# Patient Record
Sex: MALE | Race: WHITE | NOT HISPANIC OR LATINO | Employment: FULL TIME | URBAN - METROPOLITAN AREA
[De-identification: names, ages, dates, MRNs, and addresses within clinical notes are randomized per-mention and may not be internally consistent; named-entity substitution may affect disease eponyms.]

---

## 2022-09-15 ENCOUNTER — HOSPITAL ENCOUNTER (EMERGENCY)
Facility: HOSPITAL | Age: 22
Discharge: HOME | End: 2022-09-15
Attending: EMERGENCY MEDICINE | Admitting: EMERGENCY MEDICINE

## 2022-09-15 VITALS
RESPIRATION RATE: 14 BRPM | OXYGEN SATURATION: 99 % | HEART RATE: 68 BPM | DIASTOLIC BLOOD PRESSURE: 77 MMHG | TEMPERATURE: 97.8 F | WEIGHT: 260 LBS | SYSTOLIC BLOOD PRESSURE: 122 MMHG | HEIGHT: 73 IN | BODY MASS INDEX: 34.46 KG/M2

## 2022-09-15 DIAGNOSIS — R04.0 EPISTAXIS: Primary | ICD-10-CM

## 2022-09-15 PROCEDURE — 99282 EMERGENCY DEPT VISIT SF MDM: CPT

## 2022-09-15 PROCEDURE — 99282 EMERGENCY DEPT VISIT SF MDM: CPT | Performed by: EMERGENCY MEDICINE

## 2022-09-15 RX ORDER — OXYMETAZOLINE HYDROCHLORIDE 0.05 G/100ML
2 SPRAY NASAL 2 TIMES DAILY PRN
Qty: 15 ML | Refills: 0 | Status: SHIPPED | OUTPATIENT
Start: 2022-09-15 | End: 2022-09-18

## 2022-09-15 RX ORDER — METHYLPHENIDATE HYDROCHLORIDE 54 MG/1
54 TABLET ORAL DAILY
COMMUNITY

## 2022-09-16 NOTE — DISCHARGE INSTRUCTIONS
Please read the attached discharge instructions.  Come back to the emergency department for any new or concerning symptoms.    If a nosebleed occurs, spray 2 sprays of Afrin into the nose, then apply nasal clamp for at least 10 minutes, preferably 20 minutes.  If still no improvement, do another 2 sprays and wait another 20 minutes.  If still no improvement, come to the emergency department.    When you are not having a nosebleed, make sure that you are keeping your nasal passages moist with Vaseline, paying special attention to the nasal septum.    If no improvement within the next couple of weeks, see your primary care doctor for further evaluation. Tests for blood clotting issues may be necessary.

## 2022-09-16 NOTE — ED PROVIDER NOTES
[facility] FREE-STANDING ED / URGENT CARE    Patient: Go Condon 2000 [id number]  Physician: Marycruz Rosales MD  DOS: 9/15/2022    [facility]  History of Present Illness  22-year-old male with no significant past medical history presents with nearly daily nosebleeds for the past couple of months.  During one prolonged nosebleed, he felt a little lightheaded, but not persistently.  No nosebleed currently.  The patient is here because he is going on a trip soon and wants to know how to manage his nosebleeds.  He has been managing his nosebleeds by stuffing Kleenex into his nostrils.  Denies any easy bruisability or bleeding of the gums.  No medication changes.  No recent prolonged rhinorrhea.  He has had some rhinorrhea from allergies.        ROS  As reviewed in HPI above.    There is no problem list on file for this patient.    Prior to Admission medications    Medication Sig Start Date End Date Taking? Authorizing Provider   methylphenidate 54 MG CR tablet Take 54 mg by mouth Daily    Provider, MD Jay   oxymetazoline (AFRIN) 0.05 % nasal spray 2 sprays into the nostril(s) as directed by provider 2 (Two) Times a Day As Needed (nosebleed) for up to 3 days. 9/15/22 9/18/22  Marycruz Rosales MD     History reviewed. No pertinent past medical history.  History reviewed. No pertinent surgical history.  History reviewed. No pertinent family history.  Social History     Socioeconomic History   • Marital status: Significant Other     No Known Allergies    PHYSICAL EXAM  Vital Signs (last day) before discharge     Date/Time Temp Temp src Pulse Resp BP Patient Position SpO2    09/15/22 2348 -- -- 68 14 122/77 -- 99    09/15/22 2132 97.8 (36.6) -- 72 17 139/85 -- 97        Physical Exam  Vitals and nursing note reviewed.   Constitutional:       General: He is not in acute distress.     Appearance: He is well-developed.   HENT:      Head: Normocephalic and atraumatic.      Nose:      Comments: No epistaxis  noted.  No abnormalities of the nasal mucosa.  Eyes:      Conjunctiva/sclera: Conjunctivae normal.      Comments: No conjunctival pallor   Pulmonary:      Effort: Pulmonary effort is normal.   Abdominal:      General: There is no distension.   Skin:     General: Skin is warm and dry.      Coloration: Skin is not pale.      Findings: No bruising.   Neurological:      Mental Status: He is alert.   Psychiatric:         Mood and Affect: Mood normal.         Behavior: Behavior normal.           DIAGNOSTICS  No radiology results for the last day    Labs Reviewed - No data to display      MDM  Number of Diagnoses or Management Options  Epistaxis  Diagnosis management comments: Patient prescribed Afrin and instructed to apply Vaseline to his nose to prevent drying out.  Given instructions on how to stop a nosebleed with a nose clamp.  No other indication of blood dyscrasia.  He has been advised that if his symptoms do not improve within 2 weeks, he should follow-up with his primary care doctor to get work-up for blood clotting disorders.  Patient has been discharged home.           Medications - No data to display    Procedures    Final diagnoses:   Epistaxis       PATIENT CONNECTION - [facility] [address]  [phone]  In 1 week  If no improvement      ED Disposition     ED Disposition   Discharge    Condition   Stable    Comment   --             MD Connie Hernandez, Marycruz Azar MD  09/16/22 0043

## 2022-12-12 ENCOUNTER — HOSPITAL ENCOUNTER (OUTPATIENT)
Facility: HOSPITAL | Age: 22
Discharge: HOME | End: 2022-12-12
Attending: EMERGENCY MEDICINE

## 2022-12-12 ENCOUNTER — APPOINTMENT (OUTPATIENT)
Dept: GENERAL RADIOLOGY | Facility: HOSPITAL | Age: 22
End: 2022-12-12

## 2022-12-12 VITALS
OXYGEN SATURATION: 96 % | RESPIRATION RATE: 18 BRPM | WEIGHT: 250 LBS | HEART RATE: 77 BPM | TEMPERATURE: 98.4 F | SYSTOLIC BLOOD PRESSURE: 127 MMHG | DIASTOLIC BLOOD PRESSURE: 85 MMHG | HEIGHT: 73 IN | BODY MASS INDEX: 33.13 KG/M2

## 2022-12-12 DIAGNOSIS — B34.9 VIRAL SYNDROME: Primary | ICD-10-CM

## 2022-12-12 LAB
FLUAV SUBTYP SPEC NAA+PROBE: NOT DETECTED
FLUBV RNA ISLT QL NAA+PROBE: NOT DETECTED
SARS-COV-2 RNA RESP QL NAA+PROBE: NOT DETECTED
STREP A PCR: NOT DETECTED

## 2022-12-12 PROCEDURE — 71045 X-RAY EXAM CHEST 1 VIEW: CPT | Performed by: NURSE PRACTITIONER

## 2022-12-12 PROCEDURE — 87880 STREP A ASSAY W/OPTIC: CPT | Performed by: NURSE PRACTITIONER

## 2022-12-12 PROCEDURE — 87651 STREP A DNA AMP PROBE: CPT | Performed by: EMERGENCY MEDICINE

## 2022-12-12 PROCEDURE — 87636 SARSCOV2 & INF A&B AMP PRB: CPT | Performed by: NURSE PRACTITIONER

## 2022-12-12 PROCEDURE — 71045 X-RAY EXAM CHEST 1 VIEW: CPT

## 2022-12-12 PROCEDURE — 99213 OFFICE O/P EST LOW 20 MIN: CPT | Performed by: NURSE PRACTITIONER

## 2022-12-12 PROCEDURE — EDLOS: Performed by: NURSE PRACTITIONER

## 2022-12-12 PROCEDURE — 87636 SARSCOV2 & INF A&B AMP PRB: CPT | Performed by: EMERGENCY MEDICINE

## 2022-12-12 PROCEDURE — G0463 HOSPITAL OUTPT CLINIC VISIT: HCPCS | Performed by: NURSE PRACTITIONER

## 2022-12-12 RX ORDER — CETIRIZINE HYDROCHLORIDE 10 MG/1
10 TABLET ORAL NIGHTLY
Qty: 30 TABLET | Refills: 0 | Status: SHIPPED | OUTPATIENT
Start: 2022-12-12

## 2022-12-12 NOTE — FSED PROVIDER NOTE
EMERGENCY DEPARTMENT ENCOUNTER    Room Number:  04/04  Date seen:  12/12/2022  Time seen: 08:58 EST  PCP: Cristina Miller DO  Historian:     HPI:  Chief complaint:***  A complete HPI/ROS/PMH/PSH/SH/FH are unobtainable due to:   Context:Go Condon is a 22 y.o. male who presents to the ED with c/o ***    Timing:***  Duration: ***  Location:***  Radiation:***  Quality:***  Intensity/Severity:***  Associated Symptoms:***  Aggravating Factors:***  Alleviating Factors:***  Previous Episodes:***  Treatment before arrival:***      MEDICAL RECORD REVIEW  ***    ALLERGIES  Patient has no known allergies.    PAST MEDICAL HISTORY  Active Ambulatory Problems     Diagnosis Date Noted   • No Active Ambulatory Problems     Resolved Ambulatory Problems     Diagnosis Date Noted   • No Resolved Ambulatory Problems     No Additional Past Medical History       PAST SURGICAL HISTORY  No past surgical history on file.    FAMILY HISTORY  No family history on file.    SOCIAL HISTORY  Social History     Socioeconomic History   • Marital status: Significant Other       REVIEW OF SYSTEMS  Review of Systems    All systems reviewed and negative except for those discussed in HPI.     PHYSICAL EXAM    ED Triage Vitals [12/12/22 0854]   Temp Heart Rate Resp BP SpO2   98.4 °F (36.9 °C) 77 18 127/85 96 %      Temp src Heart Rate Source Patient Position BP Location FiO2 (%)   Oral Monitor Sitting Right arm --     Physical Exam      I have reviewed the triage vital signs and nursing notes.      GENERAL: ***not distressed  HENT: nares patent  EYES: no scleral icterus  NECK: no ROM limitations  CV: regular rhythm, regular rate  RESPIRATORY: normal effort  ABDOMEN: soft  : deferred  MUSCULOSKELETAL: no deformity  NEURO: alert, moves all extremities, follows commands  SKIN: warm, dry    LAB RESULTS  No results found for this or any previous visit (from the past 24 hour(s)).      RADIOLOGY RESULTS  No Radiology Exams Resulted Within Past 24 Hours        PROGRESS, DATA ANALYSIS, CONSULTS AND MEDICAL DECISION MAKING  All labs have been independently reviewed by me.  All radiology studies have been reviewed by me and discussed with radiologist dictating the report.  EKG's independently viewed and interpreted by me unless stated otherwise. Discussion below represents my analysis of pertinent findings related to patient's condition, differential diagnosis, treatment plan and final disposition.       Social Determinants of Health     Financial Resource Strain: Not on file   Food Insecurity: Not on file   Transportation Needs: Not on file   Physical Activity: Not on file   Stress: Not on file   Social Connections: Not on file   Intimate Partner Violence: Not on file   Housing Stability: Not on file       Orders placed during this visit:  No orders of the defined types were placed in this encounter.             *** Reviewed pt's history and workup with  ***.  After a bedside evaluation,  *** agrees with the plan of care.    DISCHARGE    Patient discharged in stable condition.      FOLLOW-UP  No follow-up provider specified.       Medication List      No changes were made to your prescriptions during this visit.         FOLLOW-UP  No follow-up provider specified.       Medication List      No changes were made to your prescriptions during this visit.           ***(FOR DISCHARGE) I discussed all results and noted any abnormalities with patient.  Discussed absoute need to recheck abnormalities with their family physician.  I answered any of the patient's questions.  Discussed plan for discharge, as there is no emergent indication for admission.  Pt is agreeable and understands need for follow up and repeat testing.  Pt is aware that discharge does not mean that nothing is wrong but it indicates no emergency is present and they must continue care with their family physician.  Pt is discharged with instructions to follow up with primary care doctor to have their  "blood pressure rechecked.     ***(FOR AMA)The patient would like to leave Against Medical Advice.  I have explained the risks of leaving prior to completion of evaluation.  The patient verbally agrees to accept these risks, including worsening of condition, complications and death.  They were of sound mind and mentally stable to make decisions at the time of their AMA departure.  I have encouarged the patient to return the ED at any time if symptoms persist or for any additional concerns.  I verbally advised them to follow up with their primary physician or local health clinic.    ***(FOR ADMIT) Based on the patient's lab findings and presenting symptoms, the doctor and I feel it is appropriate to admit the patient for further management, evaluation, and treatment.  I have discussed this with the admitting team.  I have also discussed this with the patient/family.  They are in agreement with admission.        Disposition vitals:  /85 (BP Location: Right arm, Patient Position: Sitting)   Pulse 77   Temp 98.4 °F (36.9 °C) (Oral)   Resp 18   Ht 185.4 cm (73\")   Wt 113 kg (250 lb)   SpO2 96%   BMI 32.98 kg/m²       DIAGNOSIS  Final diagnoses:   None       FOLLOW UP   No follow-up provider specified.      Latest Documented Vital Signs:  As of 08:58 EST  BP- 127/85 HR- 77 Temp- 98.4 °F (36.9 °C) (Oral) O2 sat- 96%    Please note that portions of this were completed with a voice recognition program.     Note Disclaimer: At [facility], we believe that sharing information builds trust and better relationships. You are receiving this note because you are receiving care at [facility] or recently visited. It is possible you will see health information before a provider has talked with you about it. This kind of information can be easy to misunderstand. To help you fully understand what it means for your health, we urge you to discuss this note with your provider.          "

## 2022-12-12 NOTE — FSED PROVIDER NOTE
Subjective   History of Present Illness patient presents with 1 week of cough, sore throat and generalized feeling rundown and fatigue.  His symptoms not made better or worse by anything.  He has been treating his symptoms with TheraFlu and NyQuil.  He denies any fever, chills or body aches.  He does travel frequently for his work.  He denies any shortness of breath, history of asthma and is not a smoker    Review of Systems   Negative except what is noted in HPI    No past medical history on file.    No Known Allergies    No past surgical history on file.    No family history on file.    Social History     Socioeconomic History   • Marital status: Significant Other           Objective   Physical Exam  Constitutional:       Appearance: He is well-developed.   HENT:      Head: Normocephalic.      Right Ear: Tympanic membrane normal.      Left Ear: Tympanic membrane normal.      Mouth/Throat:      Mouth: Mucous membranes are moist.      Pharynx: Uvula midline.      Tonsils: No tonsillar exudate. 2+ on the right. 2+ on the left.   Cardiovascular:      Rate and Rhythm: Normal rate.      Heart sounds: Normal heart sounds. No murmur heard.  Pulmonary:      Effort: Pulmonary effort is normal.      Breath sounds: Normal breath sounds.   Skin:     General: Skin is warm and dry.   Neurological:      Mental Status: He is alert.           Procedures           ED Course  ED Course as of 12/12/22 1121   Mon Dec 12, 2022   0909 DDX: viral URI, strep throat, covid, influenza   [EW]   0937 Patient's COVID, influenza and strep are negative.  I will start him on some allergy medicine.  He certainly does not appear toxic, he is not tachycardic or hypoxic.  There is no appreciable cervical lymphadenopathy.  I will add CXR to make sure he doesn't have pneumonia.  [EW]   1007 I viewed chest x-ray in PACS.  My interpretation is no acute infiltrate.  [EW]      ED Course User Index  [EW] Maru Dorsey, GARETH                                           Final diagnoses:   Viral syndrome       ED Disposition  ED Disposition     ED Disposition   Discharge    Condition   Stable    Comment   --             Cristina Miller, DO  [address] [facility]  [address]  [place] [address] [phone]    Schedule an appointment as soon as possible for a visit            Medication List      New Prescriptions    cetirizine 10 MG tablet  Commonly known as: zyrTEC  Take 1 tablet by mouth Every Night.           Where to Get Your Medications      These medications were sent to [id number] [place] - [address] AT [address] [place] - [phone] [facility] [phone] FX  [address] [place] [address]    Phone: [phone]   · cetirizine 10 MG tablet